# Patient Record
Sex: MALE | Race: ASIAN
[De-identification: names, ages, dates, MRNs, and addresses within clinical notes are randomized per-mention and may not be internally consistent; named-entity substitution may affect disease eponyms.]

---

## 2017-04-24 ENCOUNTER — PRIOR ORIGINAL RECORDS (OUTPATIENT)
Dept: OTHER | Age: 82
End: 2017-04-24

## 2017-06-27 ENCOUNTER — PRIOR ORIGINAL RECORDS (OUTPATIENT)
Dept: OTHER | Age: 82
End: 2017-06-27

## 2017-06-29 ENCOUNTER — HOSPITAL ENCOUNTER (EMERGENCY)
Facility: HOSPITAL | Age: 82
Discharge: HOME | End: 2017-06-30
Attending: EMERGENCY MEDICINE
Payer: MEDICARE

## 2017-06-29 DIAGNOSIS — R25.1 TREMULOUSNESS: Primary | ICD-10-CM

## 2017-06-29 PROCEDURE — 96360 HYDRATION IV INFUSION INIT: CPT

## 2017-06-29 PROCEDURE — 93010 ELECTROCARDIOGRAM REPORT: CPT | Performed by: EMERGENCY MEDICINE

## 2017-06-29 PROCEDURE — 96361 HYDRATE IV INFUSION ADD-ON: CPT

## 2017-06-29 PROCEDURE — 99285 EMERGENCY DEPT VISIT HI MDM: CPT

## 2017-06-29 PROCEDURE — 93005 ELECTROCARDIOGRAM TRACING: CPT

## 2017-06-30 ENCOUNTER — APPOINTMENT (OUTPATIENT)
Dept: GENERAL RADIOLOGY | Facility: HOSPITAL | Age: 82
End: 2017-06-30
Attending: EMERGENCY MEDICINE
Payer: MEDICARE

## 2017-06-30 ENCOUNTER — APPOINTMENT (OUTPATIENT)
Dept: CT IMAGING | Facility: HOSPITAL | Age: 82
End: 2017-06-30
Attending: EMERGENCY MEDICINE
Payer: MEDICARE

## 2017-06-30 VITALS
WEIGHT: 192 LBS | TEMPERATURE: 97 F | HEIGHT: 69 IN | RESPIRATION RATE: 18 BRPM | HEART RATE: 74 BPM | SYSTOLIC BLOOD PRESSURE: 112 MMHG | DIASTOLIC BLOOD PRESSURE: 68 MMHG | OXYGEN SATURATION: 99 % | BODY MASS INDEX: 28.44 KG/M2

## 2017-06-30 LAB
ALBUMIN SERPL BCP-MCNC: 3.4 G/DL (ref 3.5–4.8)
ALP SERPL-CCNC: 48 U/L (ref 32–100)
ALT SERPL-CCNC: 10 U/L (ref 17–63)
ANION GAP SERPL CALC-SCNC: 13 MMOL/L (ref 0–18)
AST SERPL-CCNC: 18 U/L (ref 15–41)
BASOPHILS # BLD: 0 K/UL (ref 0–0.2)
BASOPHILS NFR BLD: 0 %
BILIRUB DIRECT SERPL-MCNC: 0.4 MG/DL (ref 0–0.2)
BILIRUB SERPL-MCNC: 1.8 MG/DL (ref 0.3–1.2)
BILIRUB UR QL: NEGATIVE
BILIRUB UR QL: NEGATIVE
BNP SERPL-MCNC: 93 PG/ML (ref 0–100)
BUN SERPL-MCNC: 15 MG/DL (ref 8–20)
BUN/CREAT SERPL: 8.9 (ref 10–20)
CALCIUM SERPL-MCNC: 9.4 MG/DL (ref 8.5–10.5)
CHLORIDE SERPL-SCNC: 93 MMOL/L (ref 95–110)
CO2 SERPL-SCNC: 23 MMOL/L (ref 22–32)
COLOR UR: YELLOW
COLOR UR: YELLOW
CREAT SERPL-MCNC: 1.68 MG/DL (ref 0.5–1.5)
EOSINOPHIL # BLD: 0 K/UL (ref 0–0.7)
EOSINOPHIL NFR BLD: 1 %
ERYTHROCYTE [DISTWIDTH] IN BLOOD BY AUTOMATED COUNT: 24.5 % (ref 11–15)
GLUCOSE BLDC GLUCOMTR-MCNC: 139 MG/DL (ref 70–99)
GLUCOSE SERPL-MCNC: 144 MG/DL (ref 70–99)
GLUCOSE UR-MCNC: NEGATIVE MG/DL
GLUCOSE UR-MCNC: NEGATIVE MG/DL
GRAN CASTS #/AREA UR COMP ASSIST: 1 /LPF
GRAN CASTS #/AREA UR COMP ASSIST: 3 /LPF
HCT VFR BLD AUTO: 34.7 % (ref 41–52)
HGB BLD-MCNC: 11.2 G/DL (ref 13.5–17.5)
HGB UR QL STRIP.AUTO: NEGATIVE
HGB UR QL STRIP.AUTO: NEGATIVE
HYALINE CASTS #/AREA URNS AUTO: 3 /LPF
HYALINE CASTS #/AREA URNS AUTO: 3 /LPF
LEUKOCYTE ESTERASE UR QL STRIP.AUTO: NEGATIVE
LEUKOCYTE ESTERASE UR QL STRIP.AUTO: NEGATIVE
LYMPHOCYTES # BLD: 0.6 K/UL (ref 1–4)
LYMPHOCYTES NFR BLD: 8 %
MAGNESIUM SERPL-MCNC: 1.7 MG/DL (ref 1.8–2.5)
MCH RBC QN AUTO: 24.6 PG (ref 27–32)
MCHC RBC AUTO-ENTMCNC: 32.3 G/DL (ref 32–37)
MCV RBC AUTO: 76.3 FL (ref 80–100)
MONOCYTES # BLD: 0.6 K/UL (ref 0–1)
MONOCYTES NFR BLD: 8 %
NEUTROPHILS # BLD AUTO: 6.4 K/UL (ref 1.8–7.7)
NEUTROPHILS NFR BLD: 83 %
NITRITE UR QL STRIP.AUTO: NEGATIVE
NITRITE UR QL STRIP.AUTO: NEGATIVE
OSMOLALITY UR CALC.SUM OF ELEC: 271 MOSM/KG (ref 275–295)
PH UR: 6 [PH] (ref 5–8)
PH UR: 6 [PH] (ref 5–8)
PLATELET # BLD AUTO: 280 K/UL (ref 140–400)
PMV BLD AUTO: 7.5 FL (ref 7.4–10.3)
POTASSIUM SERPL-SCNC: 3.3 MMOL/L (ref 3.3–5.1)
PROT SERPL-MCNC: 6.1 G/DL (ref 5.9–8.4)
PROT UR-MCNC: 100 MG/DL
PROT UR-MCNC: 100 MG/DL
RBC # BLD AUTO: 4.55 M/UL (ref 4.5–5.9)
RBC #/AREA URNS AUTO: 4 /HPF
RBC #/AREA URNS AUTO: <1 /HPF
SODIUM SERPL-SCNC: 129 MMOL/L (ref 136–144)
SP GR UR STRIP: 1.01 (ref 1–1.03)
SP GR UR STRIP: 1.01 (ref 1–1.03)
TROPONIN I SERPL-MCNC: 0.02 NG/ML (ref ?–0.03)
TROPONIN I SERPL-MCNC: 0.02 NG/ML (ref ?–0.03)
UROBILINOGEN UR STRIP-ACNC: <2
UROBILINOGEN UR STRIP-ACNC: <2
VIT C UR-MCNC: NEGATIVE MG/DL
VIT C UR-MCNC: NEGATIVE MG/DL
WBC # BLD AUTO: 7.7 K/UL (ref 4–11)
WBC #/AREA URNS AUTO: 2 /HPF
WBC #/AREA URNS AUTO: 4 /HPF

## 2017-06-30 PROCEDURE — 83880 ASSAY OF NATRIURETIC PEPTIDE: CPT | Performed by: EMERGENCY MEDICINE

## 2017-06-30 PROCEDURE — 80076 HEPATIC FUNCTION PANEL: CPT | Performed by: EMERGENCY MEDICINE

## 2017-06-30 PROCEDURE — 81001 URINALYSIS AUTO W/SCOPE: CPT | Performed by: EMERGENCY MEDICINE

## 2017-06-30 PROCEDURE — 85025 COMPLETE CBC W/AUTO DIFF WBC: CPT | Performed by: EMERGENCY MEDICINE

## 2017-06-30 PROCEDURE — 80048 BASIC METABOLIC PNL TOTAL CA: CPT | Performed by: EMERGENCY MEDICINE

## 2017-06-30 PROCEDURE — 82962 GLUCOSE BLOOD TEST: CPT

## 2017-06-30 PROCEDURE — 84484 ASSAY OF TROPONIN QUANT: CPT | Performed by: EMERGENCY MEDICINE

## 2017-06-30 PROCEDURE — 70450 CT HEAD/BRAIN W/O DYE: CPT | Performed by: EMERGENCY MEDICINE

## 2017-06-30 PROCEDURE — 74000 XR ABDOMEN (1 VIEW) (CPT=74000): CPT | Performed by: EMERGENCY MEDICINE

## 2017-06-30 PROCEDURE — 83735 ASSAY OF MAGNESIUM: CPT | Performed by: EMERGENCY MEDICINE

## 2017-06-30 RX ORDER — SODIUM CHLORIDE 9 MG/ML
1000 INJECTION, SOLUTION INTRAVENOUS ONCE
Status: COMPLETED | OUTPATIENT
Start: 2017-06-30 | End: 2017-06-30

## 2017-06-30 NOTE — ED NOTES
Pts daughter wanting this RN to note that pt is still twitching upon discharge but this RN did not notice any twitching at this time and was explained to the daughter. Pt left his room per wheelchair with family, NAD noted, pt answer questions when asked.

## 2017-06-30 NOTE — ED NOTES
Pt presents to ED via EMS from home. Lives with son and daughter in law. C/o increasing weakness, dark urine, darker stools with one incident of bright red blood post BM, hx hemorrhoids. Episode of diarrhea Sunday.  New onset tremors/rigors noted tonight wh

## 2017-06-30 NOTE — ED PROVIDER NOTES
Patient Seen in: [facility] [place] AND [facility] Emergency Department    History   Patient presents with:  Numbness Weakness (neurologic)    Stated Complaint:     HPI    Patient is an 80-year-old male brought in by family who state that he began having tremors tonight slowly and is somewhat confused. This is apparently his norm. He has noted to be having diffuse generalized tremulousness. HEENT: Normal  Neck: Supple and nontender. Carotids normal bilaterally  Eyes: Equal round reactive.   Extraocular muscles intact TYPE NATRIUERTIC PEPTIDE) - Normal   TROPONIN I, 0 HOUR - Normal   TROPONIN I, 2 HOUR - Normal   REDRAW BMP - Normal   CBC WITH DIFFERENTIAL WITH PLATELET    Narrative:      The following orders were created for panel order CBC WITH DIFFERENTIAL WITH PLATEL

## 2017-06-30 NOTE — ED INITIAL ASSESSMENT (HPI)
Pt brought in via ems for new onset \"tremors/rigors\" bilateral arms and head/neck, left shoulder, stronger on left side for past hour. Over last week, daughter noticed darker urine, blood noted in stool, increasing weakness, diarrhea Sunday.  Denies fever

## 2017-07-05 ENCOUNTER — APPOINTMENT (OUTPATIENT)
Dept: GENERAL RADIOLOGY | Facility: HOSPITAL | Age: 82
End: 2017-07-05
Attending: EMERGENCY MEDICINE
Payer: MEDICARE

## 2017-07-05 ENCOUNTER — HOSPITAL ENCOUNTER (EMERGENCY)
Facility: HOSPITAL | Age: 82
Discharge: HOME | End: 2017-07-05
Attending: EMERGENCY MEDICINE
Payer: MEDICARE

## 2017-07-05 VITALS
SYSTOLIC BLOOD PRESSURE: 130 MMHG | DIASTOLIC BLOOD PRESSURE: 55 MMHG | OXYGEN SATURATION: 96 % | BODY MASS INDEX: 27.49 KG/M2 | RESPIRATION RATE: 18 BRPM | HEIGHT: 70 IN | HEART RATE: 67 BPM | WEIGHT: 192 LBS | TEMPERATURE: 99 F

## 2017-07-05 DIAGNOSIS — R53.1 WEAKNESS GENERALIZED: Primary | ICD-10-CM

## 2017-07-05 DIAGNOSIS — R11.0 NAUSEA: ICD-10-CM

## 2017-07-05 LAB
ALBUMIN SERPL BCP-MCNC: 3.5 G/DL (ref 3.5–4.8)
ALP SERPL-CCNC: 51 U/L (ref 32–100)
ALT SERPL-CCNC: 11 U/L (ref 17–63)
ANION GAP SERPL CALC-SCNC: 9 MMOL/L (ref 0–18)
AST SERPL-CCNC: 21 U/L (ref 15–41)
BASOPHILS # BLD: 0 K/UL (ref 0–0.2)
BASOPHILS NFR BLD: 1 %
BILIRUB DIRECT SERPL-MCNC: 0.3 MG/DL (ref 0–0.2)
BILIRUB SERPL-MCNC: 1.8 MG/DL (ref 0.3–1.2)
BILIRUB UR QL: NEGATIVE
BUN SERPL-MCNC: 11 MG/DL (ref 8–20)
BUN/CREAT SERPL: 7.8 (ref 10–20)
CALCIUM SERPL-MCNC: 9.2 MG/DL (ref 8.5–10.5)
CHLORIDE SERPL-SCNC: 99 MMOL/L (ref 95–110)
CK SERPL-CCNC: 51 U/L (ref 49–397)
CLARITY UR: CLEAR
CO2 SERPL-SCNC: 25 MMOL/L (ref 22–32)
COLOR UR: YELLOW
CREAT SERPL-MCNC: 1.41 MG/DL (ref 0.5–1.5)
CRP SERPL-MCNC: <0.5 MG/DL (ref 0–0.9)
EOSINOPHIL # BLD: 0.1 K/UL (ref 0–0.7)
EOSINOPHIL NFR BLD: 2 %
ERYTHROCYTE [DISTWIDTH] IN BLOOD BY AUTOMATED COUNT: 24 % (ref 11–15)
ERYTHROCYTE [SEDIMENTATION RATE] IN BLOOD: 15 MM/HR (ref 0–20)
GLUCOSE SERPL-MCNC: 194 MG/DL (ref 70–99)
HCT VFR BLD AUTO: 36 % (ref 41–52)
HGB BLD-MCNC: 11.7 G/DL (ref 13.5–17.5)
KETONES UR-MCNC: NEGATIVE MG/DL
LEUKOCYTE ESTERASE UR QL STRIP.AUTO: NEGATIVE
LYMPHOCYTES # BLD: 0.7 K/UL (ref 1–4)
LYMPHOCYTES NFR BLD: 12 %
MCH RBC QN AUTO: 25.4 PG (ref 27–32)
MCHC RBC AUTO-ENTMCNC: 32.6 G/DL (ref 32–37)
MCV RBC AUTO: 78 FL (ref 80–100)
MONOCYTES # BLD: 0.7 K/UL (ref 0–1)
MONOCYTES NFR BLD: 11 %
NEUTROPHILS # BLD AUTO: 4.6 K/UL (ref 1.8–7.7)
NEUTROPHILS NFR BLD: 75 %
NITRITE UR QL STRIP.AUTO: NEGATIVE
OSMOLALITY UR CALC.SUM OF ELEC: 281 MOSM/KG (ref 275–295)
PH UR: 6 [PH] (ref 5–8)
PLATELET # BLD AUTO: 223 K/UL (ref 140–400)
PMV BLD AUTO: 7.7 FL (ref 7.4–10.3)
POTASSIUM SERPL-SCNC: 3.4 MMOL/L (ref 3.3–5.1)
PROT SERPL-MCNC: 6.5 G/DL (ref 5.9–8.4)
PROT UR-MCNC: 30 MG/DL
RBC # BLD AUTO: 4.61 M/UL (ref 4.5–5.9)
RBC #/AREA URNS AUTO: 1 /HPF
SODIUM SERPL-SCNC: 133 MMOL/L (ref 136–144)
SP GR UR STRIP: 1.01 (ref 1–1.03)
TSH SERPL-ACNC: 2.87 UIU/ML (ref 0.45–5.33)
UROBILINOGEN UR STRIP-ACNC: <2
VIT C UR-MCNC: NEGATIVE MG/DL
WBC # BLD AUTO: 6.1 K/UL (ref 4–11)
WBC #/AREA URNS AUTO: 2 /HPF

## 2017-07-05 PROCEDURE — 99285 EMERGENCY DEPT VISIT HI MDM: CPT

## 2017-07-05 PROCEDURE — 80076 HEPATIC FUNCTION PANEL: CPT | Performed by: EMERGENCY MEDICINE

## 2017-07-05 PROCEDURE — 80048 BASIC METABOLIC PNL TOTAL CA: CPT | Performed by: EMERGENCY MEDICINE

## 2017-07-05 PROCEDURE — 93010 ELECTROCARDIOGRAM REPORT: CPT | Performed by: EMERGENCY MEDICINE

## 2017-07-05 PROCEDURE — 85025 COMPLETE CBC W/AUTO DIFF WBC: CPT

## 2017-07-05 PROCEDURE — 85652 RBC SED RATE AUTOMATED: CPT | Performed by: EMERGENCY MEDICINE

## 2017-07-05 PROCEDURE — 85025 COMPLETE CBC W/AUTO DIFF WBC: CPT | Performed by: EMERGENCY MEDICINE

## 2017-07-05 PROCEDURE — 93005 ELECTROCARDIOGRAM TRACING: CPT

## 2017-07-05 PROCEDURE — 82550 ASSAY OF CK (CPK): CPT | Performed by: EMERGENCY MEDICINE

## 2017-07-05 PROCEDURE — 81001 URINALYSIS AUTO W/SCOPE: CPT | Performed by: EMERGENCY MEDICINE

## 2017-07-05 PROCEDURE — 96374 THER/PROPH/DIAG INJ IV PUSH: CPT

## 2017-07-05 PROCEDURE — 84443 ASSAY THYROID STIM HORMONE: CPT | Performed by: EMERGENCY MEDICINE

## 2017-07-05 PROCEDURE — 71010 XR CHEST AP PORTABLE  (CPT=71010): CPT | Performed by: EMERGENCY MEDICINE

## 2017-07-05 PROCEDURE — 80048 BASIC METABOLIC PNL TOTAL CA: CPT

## 2017-07-05 PROCEDURE — 86140 C-REACTIVE PROTEIN: CPT | Performed by: EMERGENCY MEDICINE

## 2017-07-05 RX ORDER — ONDANSETRON 2 MG/ML
4 INJECTION INTRAMUSCULAR; INTRAVENOUS ONCE
Status: COMPLETED | OUTPATIENT
Start: 2017-07-05 | End: 2017-07-05

## 2017-07-05 NOTE — ED NOTES
Pt brought in to ed37 from home for c/o dehydration and decrease in activity. Per family, over the past few weeks pt's PO intake and activity level has greatly decreased.  Two weeks ago pt was able to ambulate to the bathroom on his own but now he is only a

## 2017-07-05 NOTE — ED INITIAL ASSESSMENT (HPI)
Pt has continued dehydration since seen in ER last Thursday, blood continued in urine, and also noted in stool. Hx hemorrhoids. Pt not eating or drinking at all per family, PCP Dr. Marina Paz at [facility] sent pt to receive fluids.  Pt is fatigued, has spells of \"m

## 2017-07-06 NOTE — ED PROVIDER NOTES
Patient Seen in: [facility] AND [facility] Emergency Department    History   Patient presents with:  Dehydration (metabolic/constitutional)  Bleeding (hematologic)    Stated Complaint:     HPI    12-year-old male with history of diabetes, congestive heart failur kg/m²         Physical Exam    General Appearance: alert, no distress, weak appearing  Eyes: pupils equal and round no pallor or injection  ENT, Mouth: mucous membranes moist  Respiratory: there are no retractions, lungs are clear to auscultation  Cardiova for panel order CBC WITH DIFFERENTIAL WITH PLATELET.   Procedure                               Abnormality         Status                     ---------                               -----------         ------                     CBC W/ DIFFERENTIAL[[id number]

## 2017-07-13 ENCOUNTER — PRIOR ORIGINAL RECORDS (OUTPATIENT)
Dept: OTHER | Age: 82
End: 2017-07-13

## 2017-07-13 ENCOUNTER — APPOINTMENT (OUTPATIENT)
Dept: CT IMAGING | Facility: HOSPITAL | Age: 82
End: 2017-07-13
Attending: EMERGENCY MEDICINE
Payer: MEDICARE

## 2017-07-13 ENCOUNTER — HOSPITAL ENCOUNTER (EMERGENCY)
Facility: HOSPITAL | Age: 82
Discharge: HOME | End: 2017-07-13
Attending: EMERGENCY MEDICINE
Payer: MEDICARE

## 2017-07-13 VITALS
WEIGHT: 192 LBS | OXYGEN SATURATION: 95 % | TEMPERATURE: 97 F | HEART RATE: 69 BPM | DIASTOLIC BLOOD PRESSURE: 69 MMHG | HEIGHT: 70 IN | BODY MASS INDEX: 27.49 KG/M2 | RESPIRATION RATE: 18 BRPM | SYSTOLIC BLOOD PRESSURE: 126 MMHG

## 2017-07-13 DIAGNOSIS — R31.9 HEMATURIA: Primary | ICD-10-CM

## 2017-07-13 LAB
ANION GAP SERPL CALC-SCNC: 9 MMOL/L (ref 0–18)
APTT PPP: 31.6 SECONDS (ref 23.2–35.3)
BACTERIA UR QL AUTO: NEGATIVE /HPF
BASOPHILS # BLD: 0 K/UL (ref 0–0.2)
BASOPHILS NFR BLD: 1 %
BILIRUB UR QL: NEGATIVE
BUN SERPL-MCNC: 5 MG/DL (ref 8–20)
BUN/CREAT SERPL: 5 (ref 10–20)
CALCIUM SERPL-MCNC: 9.2 MG/DL (ref 8.5–10.5)
CHLORIDE SERPL-SCNC: 100 MMOL/L (ref 95–110)
CLARITY UR: CLEAR
CO2 SERPL-SCNC: 29 MMOL/L (ref 22–32)
COLOR UR: YELLOW
CREAT SERPL-MCNC: 1 MG/DL (ref 0.5–1.5)
EOSINOPHIL # BLD: 0.2 K/UL (ref 0–0.7)
EOSINOPHIL NFR BLD: 4 %
ERYTHROCYTE [DISTWIDTH] IN BLOOD BY AUTOMATED COUNT: 25.1 % (ref 11–15)
GLUCOSE SERPL-MCNC: 218 MG/DL (ref 70–99)
HCT VFR BLD AUTO: 33.7 % (ref 41–52)
HGB BLD-MCNC: 11 G/DL (ref 13.5–17.5)
INR BLD: 1.1 (ref 0.9–1.2)
KETONES UR-MCNC: NEGATIVE MG/DL
LEUKOCYTE ESTERASE UR QL STRIP.AUTO: NEGATIVE
LYMPHOCYTES # BLD: 1 K/UL (ref 1–4)
LYMPHOCYTES NFR BLD: 19 %
MCH RBC QN AUTO: 25.6 PG (ref 27–32)
MCHC RBC AUTO-ENTMCNC: 32.7 G/DL (ref 32–37)
MCV RBC AUTO: 78.5 FL (ref 80–100)
MONOCYTES # BLD: 0.5 K/UL (ref 0–1)
MONOCYTES NFR BLD: 10 %
NEUTROPHILS # BLD AUTO: 3.5 K/UL (ref 1.8–7.7)
NEUTROPHILS NFR BLD: 67 %
NITRITE UR QL STRIP.AUTO: NEGATIVE
OSMOLALITY UR CALC.SUM OF ELEC: 290 MOSM/KG (ref 275–295)
PH UR: 6 [PH] (ref 5–8)
PLATELET # BLD AUTO: 198 K/UL (ref 140–400)
PMV BLD AUTO: 7.7 FL (ref 7.4–10.3)
POTASSIUM SERPL-SCNC: 3.2 MMOL/L (ref 3.3–5.1)
PROT UR-MCNC: NEGATIVE MG/DL
PROTHROMBIN TIME: 14 SECONDS (ref 11.8–14.5)
RBC # BLD AUTO: 4.29 M/UL (ref 4.5–5.9)
RBC #/AREA URNS AUTO: 1 /HPF
SODIUM SERPL-SCNC: 138 MMOL/L (ref 136–144)
SP GR UR STRIP: 1 (ref 1–1.03)
UROBILINOGEN UR STRIP-ACNC: <2
VIT C UR-MCNC: NEGATIVE MG/DL
WBC # BLD AUTO: 5.3 K/UL (ref 4–11)
WBC #/AREA URNS AUTO: 3 /HPF

## 2017-07-13 PROCEDURE — 80048 BASIC METABOLIC PNL TOTAL CA: CPT | Performed by: EMERGENCY MEDICINE

## 2017-07-13 PROCEDURE — 85610 PROTHROMBIN TIME: CPT | Performed by: EMERGENCY MEDICINE

## 2017-07-13 PROCEDURE — 36415 COLL VENOUS BLD VENIPUNCTURE: CPT

## 2017-07-13 PROCEDURE — 99284 EMERGENCY DEPT VISIT MOD MDM: CPT

## 2017-07-13 PROCEDURE — 81001 URINALYSIS AUTO W/SCOPE: CPT | Performed by: EMERGENCY MEDICINE

## 2017-07-13 PROCEDURE — 74176 CT ABD & PELVIS W/O CONTRAST: CPT | Performed by: EMERGENCY MEDICINE

## 2017-07-13 PROCEDURE — 85025 COMPLETE CBC W/AUTO DIFF WBC: CPT | Performed by: EMERGENCY MEDICINE

## 2017-07-13 PROCEDURE — 85730 THROMBOPLASTIN TIME PARTIAL: CPT | Performed by: EMERGENCY MEDICINE

## 2017-07-13 RX ORDER — LIDOCAINE HYDROCHLORIDE 20 MG/ML
10 JELLY TOPICAL ONCE
Status: COMPLETED | OUTPATIENT
Start: 2017-07-13 | End: 2017-07-13

## 2017-07-13 NOTE — ED PROVIDER NOTES
Patient Seen in: [facility] AND [facility] Emergency Department    History   Patient presents with:  Urinary Symptoms (urologic)  Bleeding (hematologic)    Stated Complaint: blood in urine and bleeding from tip of penis after urination    HPI    17-year-old male membranes moist  Respiratory: there are no retractions, lungs are clear to auscultation  Cardiovascular: regular rate and rhythm  Gastrointestinal:  abdomen is soft and non tender, no masses, bowel sounds normal  Genitourinary: No testicular or scrotal ten RAINBOW DRAW GOLD   RAINBOW DRAW DARK GREEN   RAINBOW DRAW LAVENDER TALL (BNP)       ============================================================  ED Course  ------------------------------------------------------------  MDM     Lab and CT results noted.

## 2017-07-17 ENCOUNTER — PRIOR ORIGINAL RECORDS (OUTPATIENT)
Dept: OTHER | Age: 82
End: 2017-07-17

## 2017-07-17 LAB
HEMATOCRIT: 33.7 %
HEMOGLOBIN: 11 G/DL
PLATELETS: 198 K/UL
RED BLOOD COUNT: 4.29 X 10-6/U
WHITE BLOOD COUNT: 5.3 X 10-3/U

## 2017-07-18 LAB
BUN: 5 MG/DL
CALCIUM: 9.2 MG/DL
CHLORIDE: 100 MEQ/L
CREATININE, SERUM: 1 MG/DL
GLUCOSE: 218 MG/DL
POTASSIUM, SERUM: 3.2 MEQ/L
SODIUM: 138 MEQ/L

## 2017-08-08 ENCOUNTER — PRIOR ORIGINAL RECORDS (OUTPATIENT)
Dept: OTHER | Age: 82
End: 2017-08-08

## 2017-08-08 ENCOUNTER — MYAURORA ACCOUNT LINK (OUTPATIENT)
Dept: OTHER | Age: 82
End: 2017-08-08

## 2017-08-14 ENCOUNTER — OFFICE VISIT (OUTPATIENT)
Dept: INTERNAL MEDICINE CLINIC | Facility: CLINIC | Age: 82
End: 2017-08-14

## 2017-08-14 VITALS
DIASTOLIC BLOOD PRESSURE: 62 MMHG | SYSTOLIC BLOOD PRESSURE: 110 MMHG | TEMPERATURE: 98 F | HEART RATE: 74 BPM | HEIGHT: 70 IN | BODY MASS INDEX: 28.35 KG/M2 | WEIGHT: 198 LBS | OXYGEN SATURATION: 98 %

## 2017-08-14 DIAGNOSIS — I25.10 CORONARY ARTERY DISEASE INVOLVING NATIVE HEART WITHOUT ANGINA PECTORIS, UNSPECIFIED VESSEL OR LESION TYPE: ICD-10-CM

## 2017-08-14 DIAGNOSIS — E11.9 TYPE 2 DIABETES MELLITUS WITHOUT COMPLICATION, WITHOUT LONG-TERM CURRENT USE OF INSULIN (HCC): ICD-10-CM

## 2017-08-14 DIAGNOSIS — R53.1 GENERALIZED WEAKNESS: ICD-10-CM

## 2017-08-14 DIAGNOSIS — R63.4 WEIGHT LOSS: ICD-10-CM

## 2017-08-14 DIAGNOSIS — G45.9 TRANSIENT CEREBRAL ISCHEMIA, UNSPECIFIED TYPE: ICD-10-CM

## 2017-08-14 DIAGNOSIS — Z76.89 ENCOUNTER TO ESTABLISH CARE WITH NEW DOCTOR: Primary | ICD-10-CM

## 2017-08-14 DIAGNOSIS — D50.9 IRON DEFICIENCY ANEMIA, UNSPECIFIED IRON DEFICIENCY ANEMIA TYPE: ICD-10-CM

## 2017-08-14 DIAGNOSIS — E53.8 B12 DEFICIENCY: ICD-10-CM

## 2017-08-14 PROCEDURE — 99205 OFFICE O/P NEW HI 60 MIN: CPT | Performed by: INTERNAL MEDICINE

## 2017-08-14 PROCEDURE — G0463 HOSPITAL OUTPT CLINIC VISIT: HCPCS | Performed by: INTERNAL MEDICINE

## 2017-08-14 RX ORDER — PANTOPRAZOLE SODIUM 40 MG/1
40 TABLET, DELAYED RELEASE ORAL
COMMUNITY
End: 2017-10-19

## 2017-08-14 RX ORDER — NITROGLYCERIN 0.4 MG/1
0.4 TABLET SUBLINGUAL EVERY 5 MIN PRN
COMMUNITY

## 2017-08-14 RX ORDER — METOPROLOL TARTRATE 50 MG/1
50 TABLET, FILM COATED ORAL 2 TIMES DAILY
COMMUNITY

## 2017-08-14 RX ORDER — CLOPIDOGREL BISULFATE 75 MG/1
75 TABLET ORAL DAILY
COMMUNITY

## 2017-08-14 RX ORDER — ISOSORBIDE MONONITRATE 30 MG/1
30 TABLET, EXTENDED RELEASE ORAL DAILY
COMMUNITY

## 2017-08-14 RX ORDER — FUROSEMIDE 40 MG/1
40 TABLET ORAL 2 TIMES DAILY PRN
COMMUNITY

## 2017-08-14 RX ORDER — POTASSIUM CHLORIDE 20 MEQ/1
20 TABLET, EXTENDED RELEASE ORAL 2 TIMES DAILY PRN
COMMUNITY

## 2017-08-14 RX ORDER — RANOLAZINE 500 MG/1
500 TABLET, EXTENDED RELEASE ORAL 2 TIMES DAILY
COMMUNITY
End: 2021-04-26

## 2017-08-14 NOTE — PROGRESS NOTES
Sydney Rodríguez is a 80year old malePatient presents with:  Establish Care      HPI:     Sydney Rodríguez is a 80year old male who presents for a complete physical exam.     He has a past medical history of DM, CAD, anemia, TIAs, neuropathy, p mass index is 28.41 kg/m². Current Outpatient Prescriptions:  MetFORMIN HCl 500 MG Oral Tab Take 500 mg by mouth. Disp:  Rfl:    Clopidogrel Bisulfate 75 MG Oral Tab Take 75 mg by mouth daily.  Disp:  Rfl:    Isosorbide Mononitrate ER 30 MG Oral Tab wheezing  CARDIOVASCULAR: denies chest pain or pressure or palpitations  GI: denies abdominal pain, N/V, diarrhea, hematochezia or melena. Reports constipation  : denies nocturia or changes in stream  NEURO: denies headaches, dizziness or focal weakness. FBG>200, adivsed to call and we will increase metformin further. 4. Neuropathy  Will check b12 as patient does not consume much meat. Could also be related to DM. 5. H/o TIAs  PT as ordered, continue aspirin and plavix.      6. Constipation  Will di

## 2017-08-15 ENCOUNTER — TELEPHONE (OUTPATIENT)
Dept: INTERNAL MEDICINE CLINIC | Facility: CLINIC | Age: 82
End: 2017-08-15

## 2017-08-15 PROBLEM — E53.8 B12 DEFICIENCY: Status: ACTIVE | Noted: 2017-08-15

## 2017-08-15 PROBLEM — I25.10 CORONARY ARTERY DISEASE INVOLVING NATIVE HEART WITHOUT ANGINA PECTORIS: Status: ACTIVE | Noted: 2017-08-15

## 2017-08-15 PROBLEM — E11.9 TYPE 2 DIABETES MELLITUS WITHOUT COMPLICATION, WITHOUT LONG-TERM CURRENT USE OF INSULIN (HCC): Status: ACTIVE | Noted: 2017-08-15

## 2017-08-15 PROBLEM — D50.9 IRON DEFICIENCY ANEMIA: Status: ACTIVE | Noted: 2017-08-15

## 2017-08-15 NOTE — TELEPHONE ENCOUNTER
Called Dr John Richards's office [phone] and requested recent records.   She will fax over [address] note from April, the most recent one is still in dictation-she will make note for that to be faxed over too when it is completed  She is also faxing over report

## 2017-08-15 NOTE — TELEPHONE ENCOUNTER
Please call Dr. Stephanie Malagon (cardiology, [facility]) and get recent office note and echo/stress (if any)

## 2017-09-25 ENCOUNTER — TELEPHONE (OUTPATIENT)
Dept: INTERNAL MEDICINE CLINIC | Facility: CLINIC | Age: 82
End: 2017-09-25

## 2017-09-25 NOTE — TELEPHONE ENCOUNTER
vml  Yes / DR SNIDER  To go back on Metformin 1000 mg bid   rx sent to pharm  Keep track of bs for the next few wks and call or fax in readings to DR SNIDER  Any questions call ofc

## 2017-09-25 NOTE — TELEPHONE ENCOUNTER
Zachary Fry is calling with her dad's sugar level readings, she wants to know whether they should increase his metformin. He used to take 1,000 mg of Metformin twice daily, he was having complications and was taken off the meds.  He is currently taking 500 mg of

## 2017-09-25 NOTE — TELEPHONE ENCOUNTER
Yes would like him to go back to metformin 1000mg bid. I will send a new rx for that. Please keep track of fasting blood sugars daily for the next few weeks and call or fax in the readings.

## 2017-10-19 ENCOUNTER — TELEPHONE (OUTPATIENT)
Dept: INTERNAL MEDICINE CLINIC | Facility: CLINIC | Age: 82
End: 2017-10-19

## 2017-10-19 ENCOUNTER — OFFICE VISIT (OUTPATIENT)
Dept: INTERNAL MEDICINE CLINIC | Facility: CLINIC | Age: 82
End: 2017-10-19

## 2017-10-19 VITALS
DIASTOLIC BLOOD PRESSURE: 64 MMHG | TEMPERATURE: 98 F | WEIGHT: 202 LBS | OXYGEN SATURATION: 97 % | BODY MASS INDEX: 28.92 KG/M2 | HEART RATE: 78 BPM | HEIGHT: 70 IN | RESPIRATION RATE: 18 BRPM | SYSTOLIC BLOOD PRESSURE: 116 MMHG

## 2017-10-19 DIAGNOSIS — E11.9 TYPE 2 DIABETES MELLITUS WITHOUT COMPLICATION, WITHOUT LONG-TERM CURRENT USE OF INSULIN (HCC): Primary | ICD-10-CM

## 2017-10-19 DIAGNOSIS — R07.89 CHEST DISCOMFORT: ICD-10-CM

## 2017-10-19 PROCEDURE — G0463 HOSPITAL OUTPT CLINIC VISIT: HCPCS | Performed by: INTERNAL MEDICINE

## 2017-10-19 PROCEDURE — 99213 OFFICE O/P EST LOW 20 MIN: CPT | Performed by: INTERNAL MEDICINE

## 2017-10-19 NOTE — TELEPHONE ENCOUNTER
Please call Dr. Paola Pa office (cardiology with [facility]) and see if he can see patient within the next few weeks as patient will be going to [place] for 6 months.

## 2017-10-19 NOTE — PROGRESS NOTES
Jus Pope is a 80year old male. Patient presents with: Follow - Up: Patient present for 2 month follow up, Pt states he had chest pain x3 that last 2-3 minutes in past 3-4 weeks with last episode 2 weeks ago.  Pain 3/10 to left side chest when Does not apply Misc by Does not apply route. Disp:  Rfl:    Glucose Blood (ONETOUCH VERIO) In Vitro Strip by Other route.  Disp:  Rfl:       Past Medical History:   Diagnosis Date   • Alzheimer's dementia    • Anemia    • Angina pectoris ([facility] [address]    • [facility] prior to leaving for [place]. Patient is going to [place] for 6 months and will follow up upon return. Given order for PT while there to help with his deconditioning. Also given orders for labs while there.      Lyric Jung DO  10/19/2017  3:1

## 2017-10-20 NOTE — TELEPHONE ENCOUNTER
I called Dr. Rodrigo Choi office to schedule appt for pt to be seen prior to leaving for [facility] for 6mos. Pt was seen by Dr. Ramon Agarwal for atypical chest discomfort. Dr. Patricia Zhu has no available openings. Appt made with Monico PALOMARES at 2pm on 10/25/17.     [facility]

## 2017-10-24 ENCOUNTER — PRIOR ORIGINAL RECORDS (OUTPATIENT)
Dept: OTHER | Age: 82
End: 2017-10-24

## 2017-10-27 ENCOUNTER — MYAURORA ACCOUNT LINK (OUTPATIENT)
Dept: OTHER | Age: 82
End: 2017-10-27

## 2017-10-27 ENCOUNTER — PRIOR ORIGINAL RECORDS (OUTPATIENT)
Dept: OTHER | Age: 82
End: 2017-10-27

## 2017-10-27 ENCOUNTER — TELEPHONE (OUTPATIENT)
Dept: INTERNAL MEDICINE CLINIC | Facility: CLINIC | Age: 82
End: 2017-10-27

## 2017-10-27 NOTE — TELEPHONE ENCOUNTER
To Dr. Rachel Mcdonnell, daughter would like to s/w you personally. Cardio recommended change in medication, Renexa, double to 1000. They would like to make this change with med. Pain in arm more frequent and acute. Pt said it was \"agonizing\" at 2am today.  Cardio [employer]

## 2017-10-27 NOTE — TELEPHONE ENCOUNTER
Pt. Saw cardiologist today. Daughter Candelaria Wilburn would like to speak with Dr. Manjinder Khan. She can be reached at [phone].

## 2017-10-30 NOTE — TELEPHONE ENCOUNTER
Pt's daughter was returning 's call. She was at a  earlier, and wasn't able to take the call.       Tasked to

## 2017-11-01 NOTE — TELEPHONE ENCOUNTER
Wife called PA is needed for this cream she stated pharmacy should be faxing something over soon.  Also this need to be done ASAP because pt is traveling,

## 2017-11-03 NOTE — TELEPHONE ENCOUNTER
PA done and to be faxed  Had to call [facility] and sit on hold for 12 mins but got it

## 2017-11-06 NOTE — TELEPHONE ENCOUNTER
[facility] faxed a request for additional information Re: Diclofenac. Placed in purple folder.              Tasked to Rx

## 2017-11-14 NOTE — TELEPHONE ENCOUNTER
[employer] PA Approval  Diclofenac Sodium Gel.     Placed in red folder Yee's office    Tasked to rx

## 2019-02-28 VITALS
HEIGHT: 71 IN | HEART RATE: 84 BPM | DIASTOLIC BLOOD PRESSURE: 64 MMHG | WEIGHT: 203 LBS | BODY MASS INDEX: 28.42 KG/M2 | SYSTOLIC BLOOD PRESSURE: 128 MMHG

## 2019-02-28 VITALS
DIASTOLIC BLOOD PRESSURE: 62 MMHG | HEART RATE: 68 BPM | WEIGHT: 197 LBS | HEIGHT: 64 IN | SYSTOLIC BLOOD PRESSURE: 132 MMHG | BODY MASS INDEX: 33.63 KG/M2

## 2019-02-28 VITALS
SYSTOLIC BLOOD PRESSURE: 114 MMHG | HEART RATE: 64 BPM | HEIGHT: 64 IN | WEIGHT: 199 LBS | DIASTOLIC BLOOD PRESSURE: 62 MMHG | BODY MASS INDEX: 33.97 KG/M2

## 2019-03-01 VITALS
HEIGHT: 71 IN | SYSTOLIC BLOOD PRESSURE: 104 MMHG | BODY MASS INDEX: 31.78 KG/M2 | WEIGHT: 227 LBS | DIASTOLIC BLOOD PRESSURE: 78 MMHG | HEART RATE: 68 BPM

## 2021-03-03 DIAGNOSIS — Z23 NEED FOR VACCINATION: ICD-10-CM

## (undated) NOTE — ED AVS SNAPSHOT
[facility] Emergency Department  [address]   [address] [place] [address]  Phone:  [phone]  Fax:  [phone]          Cristian Sibley   MRN: [id number]    Department:  [facility] Emergency Department   Date of Visit:  6/29/2017 visiting [email or web address]    IF THERE IS ANY CHANGE OR WORSENING OF YOUR CONDITION, CALL YOUR PRIMARY CARE PHYSICIAN AT ONCE OR RETURN IMMEDIATELY TO THE EMERGENCY DEPARTMENT.     If you have been prescribed any medication(s), please fill your prescription

## (undated) NOTE — ED AVS SNAPSHOT
[facility] Emergency Department  [address]   [address] [place] [address]  Phone:  [phone]  Fax:  [phone]          German Ramirez   MRN: [id number]    Department:  [facility] Emergency Department   Date of Visit:  7/13/2017 visiting [email or web address]    IF THERE IS ANY CHANGE OR WORSENING OF YOUR CONDITION, CALL YOUR PRIMARY CARE PHYSICIAN AT ONCE OR RETURN IMMEDIATELY TO THE EMERGENCY DEPARTMENT.     If you have been prescribed any medication(s), please fill your prescription

## (undated) NOTE — ED AVS SNAPSHOT
[facility] Emergency Department  [address]   [address] [place] [address]  Phone:  [phone]  Fax:  [phone]          Eva Burleson   MRN: [id number]    Department:  [facility] Emergency Department   Date of Visit:  7/5/2017 visiting [email or web address]    IF THERE IS ANY CHANGE OR WORSENING OF YOUR CONDITION, CALL YOUR PRIMARY CARE PHYSICIAN AT ONCE OR RETURN IMMEDIATELY TO THE EMERGENCY DEPARTMENT.     If you have been prescribed any medication(s), please fill your prescription